# Patient Record
Sex: MALE | Race: WHITE | URBAN - METROPOLITAN AREA
[De-identification: names, ages, dates, MRNs, and addresses within clinical notes are randomized per-mention and may not be internally consistent; named-entity substitution may affect disease eponyms.]

---

## 2023-04-03 ENCOUNTER — OFFICE VISIT (OUTPATIENT)
Dept: URBAN - METROPOLITAN AREA CLINIC 90 | Facility: CLINIC | Age: 68
End: 2023-04-03
Payer: MEDICARE

## 2023-04-03 DIAGNOSIS — H26.493 OTHER SECONDARY CATARACT, BILATERAL: ICD-10-CM

## 2023-04-03 DIAGNOSIS — H35.033 HYPERTENSIVE RETINOPATHY, BILATERAL: Primary | ICD-10-CM

## 2023-04-03 PROCEDURE — 92134 CPTRZ OPH DX IMG PST SGM RTA: CPT | Performed by: OPHTHALMOLOGY

## 2023-04-03 PROCEDURE — 99204 OFFICE O/P NEW MOD 45 MIN: CPT | Performed by: OPHTHALMOLOGY

## 2023-04-03 ASSESSMENT — INTRAOCULAR PRESSURE
OD: 18
OS: 16

## 2023-04-03 ASSESSMENT — VISUAL ACUITY
OD: 20/25
OS: 20/20

## 2023-04-03 NOTE — IMPRESSION/PLAN
Impression: Other secondary cataract, bilateral: H26.493. Plan: Discussed posterior capsular opacification OD. Due to the fact that PCO is advanced and affecting the patient's vision and quality of life, YAG capsulotomy will be scheduled. All risks, benefits and alternatives were explained in detail including: possibility of retinal detachment,
dislocated IOL, loss of vision or eye and possible need for further surgery.

## 2023-04-03 NOTE — IMPRESSION/PLAN
Impression: Hypertensive retinopathy, bilateral: H35.033. Plan: OCTm shows AV changes. Will continue to monitor. Evidence of hypertensive retinopathy found. The patient was advised to see primary care physician to keep blood pressure under good control. Risk of sudden vision loss associated with hypertension was discussed with patient. If vision suddenly changes, patient should notify our office immediately.

## 2023-04-13 ENCOUNTER — OFFICE VISIT (OUTPATIENT)
Dept: URBAN - METROPOLITAN AREA CLINIC 91 | Facility: CLINIC | Age: 68
End: 2023-04-13
Payer: MEDICARE

## 2023-04-13 DIAGNOSIS — H26.493 OTHER SECONDARY CATARACT, BILATERAL: Primary | ICD-10-CM

## 2023-04-13 PROCEDURE — 66821 AFTER CATARACT LASER SURGERY: CPT | Performed by: OPHTHALMOLOGY

## 2023-04-24 ENCOUNTER — OFFICE VISIT (OUTPATIENT)
Dept: URBAN - METROPOLITAN AREA CLINIC 90 | Facility: CLINIC | Age: 68
End: 2023-04-24
Payer: MEDICARE

## 2023-04-24 DIAGNOSIS — Z48.810 ENCOUNTER FOR SURGICAL AFTERCARE FOLLOWING SURGERY ON A SENSE ORGAN: Primary | ICD-10-CM

## 2023-04-24 PROCEDURE — 99024 POSTOP FOLLOW-UP VISIT: CPT | Performed by: OPHTHALMOLOGY

## 2023-04-24 ASSESSMENT — INTRAOCULAR PRESSURE
OD: 16
OS: 17

## 2023-04-24 ASSESSMENT — VISUAL ACUITY
OD: 20/20
OS: 20/20

## 2023-04-24 NOTE — IMPRESSION/PLAN
Impression: Encounter for surgical aftercare following surgery on a sense organ: Z48.810. Plan: S/P yag laser right eye, doing well. Will continue to observe. 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

## 2024-01-16 ENCOUNTER — OFFICE VISIT (OUTPATIENT)
Dept: URBAN - METROPOLITAN AREA CLINIC 91 | Facility: CLINIC | Age: 69
End: 2024-01-16
Payer: MEDICARE

## 2024-01-16 PROCEDURE — 99214 OFFICE O/P EST MOD 30 MIN: CPT | Performed by: OPHTHALMOLOGY

## 2024-01-16 RX ORDER — NEOMYCIN SULFATE, POLYMYXIN B SULFATE AND DEXAMETHASONE 1; 3.5; 1 MG/ML; MG/ML; [USP'U]/ML
SUSPENSION OPHTHALMIC
Qty: 5 | Refills: 1 | Status: ACTIVE
Start: 2024-01-16

## 2024-01-16 ASSESSMENT — VISUAL ACUITY
OD: 20/20
OS: 20/20

## 2024-01-16 ASSESSMENT — INTRAOCULAR PRESSURE
OS: 16
OD: 15

## 2024-01-22 ENCOUNTER — OFFICE VISIT (OUTPATIENT)
Dept: URBAN - METROPOLITAN AREA CLINIC 90 | Facility: CLINIC | Age: 69
End: 2024-01-22
Payer: MEDICARE

## 2024-01-22 DIAGNOSIS — H11.31 SUBCONJUNCTIVAL HEMORRHAGE OF RT EYE: Primary | ICD-10-CM

## 2024-01-22 DIAGNOSIS — H26.492 OTHER SECONDARY CATARACT, LEFT EYE: ICD-10-CM

## 2024-01-22 PROCEDURE — 99213 OFFICE O/P EST LOW 20 MIN: CPT | Performed by: OPHTHALMOLOGY

## 2024-01-22 ASSESSMENT — INTRAOCULAR PRESSURE
OD: 19
OS: 19